# Patient Record
Sex: MALE | Race: WHITE | NOT HISPANIC OR LATINO | Employment: OTHER | URBAN - METROPOLITAN AREA
[De-identification: names, ages, dates, MRNs, and addresses within clinical notes are randomized per-mention and may not be internally consistent; named-entity substitution may affect disease eponyms.]

---

## 2021-06-29 PROBLEM — M54.2 NECK PAIN: Status: ACTIVE | Noted: 2021-06-29

## 2021-06-29 PROBLEM — M50.30 DDD (DEGENERATIVE DISC DISEASE), CERVICAL: Status: ACTIVE | Noted: 2021-06-29

## 2021-06-29 PROBLEM — M48.02 CERVICAL SPINAL STENOSIS: Status: ACTIVE | Noted: 2021-06-29

## 2022-04-23 PROBLEM — Z98.1 S/P CERVICAL SPINAL FUSION: Status: ACTIVE | Noted: 2022-04-23
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
## 2024-09-09 PROBLEM — S32.039A L3 VERTEBRAL FRACTURE: Status: ACTIVE | Noted: 2024-09-09

## 2024-09-12 PROBLEM — S92.512A CLOSED DISPLACED FRACTURE OF PROXIMAL PHALANX OF LESSER TOE OF LEFT FOOT: Status: ACTIVE | Noted: 2024-09-12

## 2024-09-12 PROBLEM — D62 ACUTE BLOOD LOSS AS CAUSE OF POSTOPERATIVE ANEMIA: Status: ACTIVE | Noted: 2024-09-12

## 2024-09-17 PROBLEM — I25.810 CORONARY ARTERY DISEASE INVOLVING CORONARY BYPASS GRAFT: Status: ACTIVE | Noted: 2024-09-17

## 2024-09-17 PROBLEM — I10 HYPERTENSION: Status: ACTIVE | Noted: 2024-09-17

## 2024-11-16 ENCOUNTER — HOSPITAL ENCOUNTER (OUTPATIENT)
Dept: TELEMEDICINE | Facility: HOSPITAL | Age: 79
Discharge: HOME | End: 2024-11-16
Payer: OTHER GOVERNMENT

## 2024-11-16 NOTE — SUBJECTIVE & OBJECTIVE
HPI:  79 y.o. male with a PMHx significant for HTN, CAD, recent trauma (fall from a porch) with L3 vertebral body fracture and L2-L5 fusion with neurosurgery 09/2024 presenting with speech difficulties.     LKN 1030 when he developed a headache, then had non-sensical speech, word-finding difficulties.      SBP 200s on arrival. S/p IV hydralazine around 1100.     Images personally reviewed and interpreted:  Study: Head CT and CTA Head & Neck  Study Interpretation: No acute intracranial hemorrhage. No intracranial LVO. Moderate/severe stenosis of the left carotid artery.      Assessment and plan:  # Stroke determined by clinical assessment. He is not a candidate for IV thrombolysis given recent spine surgery. No LVO on CTA amenable for EVT. Patient will be admitted for work-up.     Lytics recommendation: Thrombolytic therapy not recommended due to Recent surgery  Thrombectomy recommendation: No; No large vessel occlusion identified on imaging   Placement recommendation: pending further studies     Imaging:   - Recommend follow-up non-urgent MRI brain without contrast to evaluate for acute ischemia.  - If above studies are abnormal, please load images to [email or web address] imaging system and contact us to review.    Antithrombotics for secondary stroke prevention: Load clopidogrel 300mg x 1 now. Then start dual-antiplatelet therapy with ASA 81mg and clopidogrel 75mg daily for 21 days. Then continue ASA 81mg indefinitely.     Statins for secondary stroke prevention and hyperlipidemia, if present: Recommend initiation or continuation of a high-intensity statin for goal LDL < 70mg/dL.    Aggressive risk factor modification: HTN, HLD     Rehab efforts: The patient has been evaluated by a stroke team provider and the therapy needs have been fully considered based off the presenting complaints and exam findings. The following therapy evaluations are needed: PT evaluate and treat, OT evaluate and treat, SLP evaluate and  treat    Diagnostics recommended:   - Recommend CUS with vascular surgery consultation if there is evidence of moderate/severe carotid stenosis.   - MRI brain without contrast   - TTE without bubble study, monitor on telemetry while admitted  - Labs: hemoglobin A1c (goal <7%), lipid panel (LDL goal <70mg/dL), TSH  - Treat hyperglycemia to achieve a blood glucose level in a range of 140-180 mg/dL and to closely monitor to prevent hypoglycemia (Class IIa, Level C-LD).    VTE prophylaxis: Enoxaparin 40 mg SQ every 24 hours  Mechanical prophylaxis: Place SCDs    BP parameters: Infarct: No intervention, SBP <220    - Patient may be at risk for worsening deficits with positional changes or drops in blood pressure  (pressure-dependent state), or if there is extension of stroke or development of cerebral edema;  as such, recommend close neurological monitoring.  - Recommend permissive hypertension for the initial 24 to 48 hours of acute ischemic stroke unless the blood pressure is >220 mm Hg systolic or >120 mm Hg diastolic, or there is a concomitant specific medical condition that would benefit from blood pressure lowering (e.g., MI, aortic dissection).  - In the setting of possible hypovolemia, initiate IV fluids to maintain adequate hydration and euvolemia (although be cautious in patients at risk of fluid overload, such as those with renal or heart failure).  - The optimal time after the onset of acute ischemic stroke to restart or start long-term antihypertensive therapy has not been established and may depend on various patient and stroke characteristics (e.g., pressure dependent state), but in most patients it is reasonable to initiate long-term antihypertensive therapy after the initial 48 to 72 hours from stroke onset.    Please contact us with any further questions or concerns or if patient has any acute neurological changes (new symptoms, worsening deficits).

## 2024-11-16 NOTE — TELEMEDICINE CONSULT
[facility]  Vascular Neurology  Comprehensive Stroke Center  TeleVascular Neurology Acute Consultation Note        Consult Information  Consults    Consulting Provider: JEANNIE STAPLES   Current Providers  No providers found    Patient Location: [facility] ED [facility] Emergency Department    Spoke hospital nurse at bedside with patient assisting consultant.  Patient information was obtained from patient and primary team.       Stroke Documentation  Acute Stroke Times   Last Known Normal Date: 11/16/24  Last Known Normal Time: 1030  Symptom Onset Date: 11/16/24  Symptom Onset Time: 1030  Stroke Team Called Date: 11/16/24  Stroke Team Called Time: 1330  Stroke Team Arrival Date: 11/16/24  Stroke Team Arrival Time: 1339  CT Interpretation Time: 1345  Thrombolytic Therapy Recommended: No    NIH Scale:  Interval: baseline  1a. Level of Consciousness: 0-->Alert, keenly responsive  1b. LOC Questions: 2-->Answers neither question correctly  1c. LOC Commands: 2-->Performs neither task correctly  2. Best Gaze: 0-->Normal  3. Visual: 0-->No visual loss  4. Facial Palsy: 0-->Normal symmetrical movements  5a. Motor Arm, Left: 0-->No drift, limb holds 90 (or 45) degrees for full 10 secs  5b. Motor Arm, Right: 0-->No drift, limb holds 90 (or 45) degrees for full 10 secs  6a. Motor Leg, Left: 0-->No drift, leg holds 30 degree position for full 5 secs  6b. Motor Leg, Right: 0-->No drift, leg holds 30 degree position for full 5 secs  7. Limb Ataxia: 0-->Absent  8. Sensory: 0-->Normal, no sensory loss  9. Best Language: 2-->Severe aphasia, all communication is through fragmentary expression, great need for inference, questioning, and guessing by the listener. Range of information that can be exchanged is limited, listener carries burden of. . . (see row details)  10. Dysarthria: 0-->Normal  11. Extinction and Inattention (formerly Neglect): 0-->No  abnormality  Total (NIH Stroke Scale): 6      Modified [place]: Score: 2  [place] Coma Scale:     ABCD2 Score:    [id number] Score:    HAS -BLED Score:    ICH Score:    Hunt & Laboy Classification:      There were no vitals taken for this visit.      In my opinion, this was a: Tier 1; VAN Stroke Assessment: Negative     Medical Decision Making  HPI:  79 y.o. male with a PMHx significant for HTN, CAD, recent trauma (fall from a porch) with L3 vertebral body fracture and L2-L5 fusion with neurosurgery 09/2024 presenting with speech difficulties.     LKN 1030 when he developed a headache, then had non-sensical speech, word-finding difficulties.      SBP 200s on arrival. S/p IV hydralazine around 1100.     Images personally reviewed and interpreted:  Study: Head CT and CTA Head & Neck  Study Interpretation: No acute intracranial hemorrhage. No intracranial LVO. Moderate/severe stenosis of the left carotid artery.      Assessment and plan:  # Stroke determined by clinical assessment. He is not a candidate for IV thrombolysis given recent spine surgery. No LVO on CTA amenable for EVT. Patient will be admitted for work-up.     Lytics recommendation: Thrombolytic therapy not recommended due to Recent surgery  Thrombectomy recommendation: No; No large vessel occlusion identified on imaging   Placement recommendation: pending further studies     Imaging:   - Recommend follow-up non-urgent MRI brain without contrast to evaluate for acute ischemia.  - If above studies are abnormal, please load images to teleneurology imaging system and contact us to review.    Antithrombotics for secondary stroke prevention: Load clopidogrel 300mg x 1 now. Then start dual-antiplatelet therapy with ASA 81mg and clopidogrel 75mg daily for 21 days. Then continue ASA 81mg indefinitely.     Statins for secondary stroke prevention and hyperlipidemia, if present: Recommend initiation or continuation of a high-intensity statin for goal LDL <  70mg/dL.    Aggressive risk factor modification: HTN, HLD     Rehab efforts: The patient has been evaluated by a stroke team provider and the therapy needs have been fully considered based off the presenting complaints and exam findings. The following therapy evaluations are needed: PT evaluate and treat, OT evaluate and treat, SLP evaluate and treat    Diagnostics recommended:   - Recommend CUS with vascular surgery consultation if there is evidence of moderate/severe carotid stenosis.   - MRI brain without contrast   - TTE without bubble study, monitor on telemetry while admitted  - Labs: hemoglobin A1c (goal <7%), lipid panel (LDL goal <70mg/dL), TSH  - Treat hyperglycemia to achieve a blood glucose level in a range of 140-180 mg/dL and to closely monitor to prevent hypoglycemia (Class IIa, Level C-LD).    VTE prophylaxis: Enoxaparin 40 mg SQ every 24 hours  Mechanical prophylaxis: Place SCDs    BP parameters: Infarct: No intervention, SBP <220    - Patient may be at risk for worsening deficits with positional changes or drops in blood pressure  (pressure-dependent state), or if there is extension of stroke or development of cerebral edema;  as such, recommend close neurological monitoring.  - Recommend permissive hypertension for the initial 24 to 48 hours of acute ischemic stroke unless the blood pressure is >220 mm Hg systolic or >120 mm Hg diastolic, or there is a concomitant specific medical condition that would benefit from blood pressure lowering (e.g., MI, aortic dissection).  - In the setting of possible hypovolemia, initiate IV fluids to maintain adequate hydration and euvolemia (although be cautious in patients at risk of fluid overload, such as those with renal or heart failure).  - The optimal time after the onset of acute ischemic stroke to restart or start long-term antihypertensive therapy has not been established and may depend on various patient and stroke characteristics (e.g., pressure  dependent state), but in most patients it is reasonable to initiate long-term antihypertensive therapy after the initial 48 to 72 hours from stroke onset.    Please contact us with any further questions or concerns or if patient has any acute neurological changes (new symptoms, worsening deficits).           ROS  Physical Exam  Past Medical History:   Diagnosis Date    Acute kidney failure     Allergic rhinitis     Anemia     Anxiety disorder     Atherosclerotic heart disease native coronary artery w/angina pectoris     BPH (benign prostatic hyperplasia)     Dry eye syndrome     GERD (gastroesophageal reflux disease)     Gout     Hyperlipidemia     Hypothyroidism     Insomnia     Major depressive disorder     Obesity      Past Surgical History:   Procedure Laterality Date    FUSION, SPINE, LUMBAR, POSTERIOR APPROACH N/A 9/11/2024    Procedure: L2-4 Posterior Decompression & Fusion, possible extension of levels;  Surgeon: Seferino Crespo MD;  Location: [facility] MA[facility];  Service: Neurosurgery;  Laterality: N/A;    REPAIR, ARTERY, RADIAL Right 9/11/2024    Procedure: REPAIR, ARTERY, RADIAL;  Surgeon: Varun Shaw MD;  Location: hospitals MAIN OR;  Service: Vascular;  Laterality: Right;  RETRIEVAL OF WIRE AND REPAIR OF RADIAL ARTERY    SPINE SURGERY       ACDF C3-6, 07/06/21, WKDR. ABEL ORTIZ.     No family history on file.    Diagnoses  No problems updated.    Yu Jenkins MD      Emergent/Acute neurological consultation requested by spoke provider due to critical concerns for possible cerebrovascular event that could result in permanent loss of neurologic/bodily function, severe disability or death of this patient.  Immediate/timely evaluation by a highly prepared expert is paramount for optimal outcomes  High risk for neurological deterioration if not properly diagnosed  High risk for neurological deterioration if not treated promplty/as soon as possible  Complex diagnostic evaluation may be required (advanced  imaging)  High risk treatment options (thrombolytics and/or thrombectomy)    Patient care was coordinated with spoke provider, including but not limted to    Discussing likely diagnosis/etiology of symptoms  Making recommendations for further diagnostic studies  Making recommendations for intravenous thrombolytics or other advanced therapies  Making recommendations for disposition (admission/transfer for higher level of care)